# Patient Record
Sex: FEMALE | Race: WHITE
[De-identification: names, ages, dates, MRNs, and addresses within clinical notes are randomized per-mention and may not be internally consistent; named-entity substitution may affect disease eponyms.]

---
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
## 2017-02-06 NOTE — DS
DATE OF ADMISSION:  02/03/2017.

 

DATE OF DISCHARGE:  02/06/2017.

 

DISCHARGE DIAGNOSES:

 

AXIS I:  Adjustment disorder with depressed mood; bulimia nervosa by history.

 

AXIS II:  Deferred.

 

AXIS III:  None.

 

AXIS IV:  Moderate primary support stressors.

 

AXIS V:  At the time of admission was 45 and at the time of discharge is 60.

 

CONDITION AT THE TIME OF DISCHARGE:  Stable.  The patient is calm and cooperative. She is denying an
y suicidal or homicidal ideations.  She is future oriented, stating that she has a research project 
that she is going to be working on this week at school and she is agreeable with outpatient follow-u
p with her therapist in the community.  The acute stressor of this hospitalization was that she had 
treatment emergent suicidal ideations from an initiation of treatment with Escitalopram.  The Escita
lopram has since been discontinued and since its discontinuation, she has not had any further though
ts of self-harm.

 

MENTAL STATUS EXAM:  The patient is a young, white female with brown hair.  She is clean and well-gr
oomed with excellent eye contact and good posture.  There is no sign of movement abnormalities.  Spe
ech has a normal rate, tone and volume.  Her mood is euthymic with a full affect.  Thought process i
s linear and goal-directed. Thought content is significant for her desire to leave the hospital.  Sh
e is denying suicidal or homicidal ideations.  She denies auditory of visual hallucinations.  Insigh
t and judgment appears to be fair given her willingness to follow-up with outpatient treatment.  Cog
nitively, she is awake and alert with what appears to be an average intellect.

 

DISCHARGE INSTRUCTIONS TO THE PATIENT: A.  Medications:  She is not on any current medications.

 

B.  Diet:  Regular.

 

C.  Activities:  As tolerated.  The patient is a nonsmoker.

 

D.  Follow-up care:  The patient will be following up within the next three days with her outpatient
 therapist, whose name is Katie Fry.

 

HOSPITAL COURSE - PART A: Reason for admission:  The patient is a 21-year-old, single, white female 
with a history of depression and eating disorder problems who was brought to the emergency room by [facility] Police after endorsing suicidal ideations at the [facility].  Eloise
arently, the patient had recently gotten into treatment at the [facility] through [facility] and she actually started an antidepressant which was Escitalopram 5 mg p.o. daily, 
started one week prior to presentation.  She felt fine at first, but then started noticing that she 
was having profound visions of either hanging herself or cutting her wrists.  She was alarmed enough
 to call the [facility] where she got an emergency appointment.  There she continu
ed to endorse these thoughts, however with no intention.  They felt that it would be safest to send 
her to the hospital and she was upset about this given the fact that she was frisked by security and
 walked out by EMT's in front of several peers who were in the waiting area.  At our emergency room,
 she denied suicidal ideation, stating that she was quite certain that her suicidality was due to th
e medication and now that she has discontinued it, she feels better.  On the morning of this evaluat
ion, she continued to assert the same sentiment.  I did speak with her father, who had communicated 
with her via phone from his home in [place].  He indicated that the patient had broken up with her 
boyfriend of the past one-and-a-half years and that she did, in fact, have a history of suicidal tho
ughts, although no significant attempts in the past.  He neither thought that she would benefit for 
admission, nor did he state that we should discharge her.  Ultimately, the treatment team felt that 
she would be best suited by being admitting given the lack of availability of follow-up services ove
r the weekend at [facility] [place].  When I screened her at the time of admission for neurovegetative symptoms
 of depression, she mostly denied everything.  When I asked her about the recent break-up with her b
oyfriend, she stated that this was actually something that had lifted her mood.  Her boyfriend appar
ently lives in [place], [place] and the two of them have become estranged and the patient felt
 that the break-up was for the best.

 

HOSPITAL COURSE - PART B: Psychiatric treatment rendered: The patient was admitted to the Adult Beha
vioral Health Unit where she was placed on q.30 minute checks for her own safety.  We continued the 
washout of her Escitalopram, and throughout the weekend that she was under our care, she continued t
o steadfastly deny suicidal ideations.  Ultimately, she opted not to follow-up with [facility] and states 
that she is not interested in antidepressant therapy at this time.  She is wanting to return to [facility] with her past private psychotherapist in the community, a woman named Katie Fry on [facility].  This appointment was set by the social work staff and we feel that the patient is oli
dy for discharge.  Her father has been contacted again and is in agreement with the discharge plan.

 

 [id number]/[facility] #: [id number]

## 2019-09-16 ENCOUNTER — HOSPITAL ENCOUNTER (OUTPATIENT)
Dept: HOSPITAL 25 - OREAST | Age: 24
Discharge: HOME | End: 2019-09-16
Attending: ORTHOPAEDIC SURGERY
Payer: COMMERCIAL

## 2019-09-16 VITALS — DIASTOLIC BLOOD PRESSURE: 94 MMHG | SYSTOLIC BLOOD PRESSURE: 114 MMHG

## 2019-09-16 DIAGNOSIS — M23.8X2: ICD-10-CM

## 2019-09-16 DIAGNOSIS — T84.84XA: Primary | ICD-10-CM

## 2019-09-16 DIAGNOSIS — F32.9: ICD-10-CM
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
## 2019-09-16 PROCEDURE — 88300 SURGICAL PATH GROSS: CPT

## 2019-09-16 PROCEDURE — 76000 FLUOROSCOPY <1 HR PHYS/QHP: CPT

## 2019-09-16 PROCEDURE — 81025 URINE PREGNANCY TEST: CPT

## 2019-09-16 NOTE — OP
CC:  PCP, [facility] *

 

DATE OF OPERATION:  09/16/19 [facility]

 

DATE OF BIRTH:  [birth year]

 

SURGEON:  Zenobia Narvaez MD

 

ASSISTANT:  CASH Williamson.  An assistant was needed for the entirety of 
the case to help with positioning, retraction, and was utilized throughout all 
portions of the case.

 

ANESTHESIOLOGIST:  Dr. James.

 

ANESTHESIA:  General.

 

PRE-OP DIAGNOSIS:  Left knee painful retained hardware.

 

POST-OP DIAGNOSIS:  Left knee painful retained hardware.

 

OPERATIVE PROCEDURE:  Left knee open removal of hardware, 2 screws.

 

COMPLICATIONS:  None.

 

ESTIMATED BLOOD LOSS:  Minimal.

 

INDICATIONS:  Rimma Munroe is a 23-year-old female who has had persistent 
subluxation and dislocation of the knee cap, who underwent a patella 
realignment arthroscopy about a year ago.  She had completely healed her 
osteotomy and had some pain where the screw heads were.  We talked about 
removal of hardware.  She elected to proceed.  Risks and benefits were 
discussed in length included, but are not limited to bleeding; infection; 
damage to nerves, vessels, surrounding structures; wound nonhealing; persistent 
pain; need for further surgery; scarring; stiffness; incomplete relief of 
symptoms; risks of anesthesia.

 

DESCRIPTION OF PROCEDURE:  The patient was greeted in the preoperative area by 
the attending surgeon.  Correct extremity was marked and consent was confirmed.
  The was brought back to the operating suite where she was placed in supine 
position on the operating table, underwent general anesthesia and endotracheal 
intubation, after which she was appropriately positioned in the bed.  The left 
knee and leg were prepped and draped in usual sterile fashion beginning with 
chlorhexidine soap, scrub, and alcohol wipe, and a final prep with ChloraPrep.

 

After appropriate surgical pause indicating side, site, procedure, and 
administration of antibiotics, the previously made incision was incised with a 
15 blade.  Soft tissues were carefully dissected to expose the hardware.  Both 
screws were readily identified and had scar tissue over it.  These were then 
removed in their entirety, but no evidence of breaking the screw site.  Screw 
holes were then debrided back using a curette and then the wounds were 
copiously irrigated with sterile saline.  The incision was closed in layers 
with 3-0 Monocryl and 3-0 nylon. Sterile dressings were applied.  Knee was 
superficially injected with 0.2% ropivacaine for pain control.  Sterile 
dressings were applied.  She was awoken from anesthesia, transferred to the 
PACU in stable condition.

 

An x-ray was done after the dressing was put on to confirm that there was no 
retained hardware and no fracture.

 

POSTOPERATIVE PLAN:  She will be weightbearing as tolerated.  Range of motion 
as tolerated.  Discharged on pain medications and antibiotics.  DVT prophylaxis 
was considered, but deferred due to no previous personal or family history.  I 
will see the patient back in 10 to 14 days.

 

 [id number]/[facility] #: [id number]

JOCELIN

## 2021-10-27 NOTE — PN
MHU: Group Therapy Note





- Service Type


Service Type: 91329 Group Psychotherapy - Cognitive Behavioral Group Therapy (

CBT):Patient was attentive and participatory in CBT programming this morning, 

and remained in good behavioral control.  Patient expressed positive insights 

regarding relevant treatment interventions and goals. Suturegard Body: The suture ends were repeatedly re-tightened and re-clamped to achieve the desired tissue expansion.

## 2024-05-01 NOTE — ED
I, Slim Cain, scribed for José Miguel Guillen MD on 02/03/17 at 1814 .





Progress





- Progress Note


Progress Note: 


This is a sign out from Dr. Rivera who will be voluntarily admitted to psych. 

She consulted with mental health with no complications.





- Consult/PCP


Time Called: 14:48





Course/Dx





- Provider Notifications


Discussed Care Of Patient With: 14:48p Yiselchhaya Padron about the case.  I 

told her that she needs admission for psychiatric stabilization due to the 

vivid and unprovoked suicidal thoughts.  She agreed.  14:59p Discussed the case 

with Dr. Guillen about the patient needing admission for psychiatric 

stabilization due to new onset suicidal thoughts without inciting cause other 

than new medication.  She is medically cleared and unlikely to be an occult 

hyperthyroid cause.  Yisel Padron has already seen the patient and agrees 

with my plan.  If the psychiatrist feels patient can be discharged, she would 

need to have a safe environment and support system, as well as urgent 

psychiatry follow up.





- Diagnoses


Provider Diagnoses: 


 Suicidal ideations








The documentation as recorded by the Ant quinonez Aidan accurately reflects 

the service I personally performed and the decisions made by me, José Miguel Guillen MD.
Psychiatric Complaint





- HPI Summary


HPI Summary: 





Patient presents for evaluation of suicidal thoughts.  Patient has been slowly 

increasing her Escitalopram over the last 7 days, according to instructions 

from the [facility] provider.  However, in the last few days has 

had clear suicidal thoughts.  Started on Escitalopram last week due to 

depression, which is a recurrent issue; however, has never had thoughts or 

attempts at suicide.  Called her counselor and was directed to the ED.  Denies 

any coingestants, abd or chest pain, dyspnea, systemic symptoms.  No specific 

aggrav factors.





- History Of Current Complaint


Chief Complaint: EDMentalHealth


Time Seen by Provider: 02/03/17 13:04


Hx Obtained From: Patient


Onset/Duration: Gradual Onset, Lasting Days


Timing: Constant


Severity Initially: Moderate


Severity Currently: Moderate


Character: Depressed


Aggravating Factor(s): Nothing


Alleviating Factor(s): Medication


Associated Signs And Symptoms: Negative: Hostile, Confused, Hallucinating, 

Paranoid Behavior, Sleep Disturbance, Appetite Change, Social Withdrawal, 

Social Isolation


Related History: Positive For: Prior Psychiatric Issues


Has Suicidal: Reports: Thoughts.  Denies: Demonstrates Gesture, Has Prior 

Attempt(s)


Has Homicidal: Denies: Thoughts, With A Plan, Demonstrates Gesture, Has Prior 

Attempt(s)


Recent Stressor(s): None





- Risk Factor(s)


Completed Suicide Risk Factors: Negative





- Allergies/Home Medications


Home Medications: 


 Home Medications





Lexapro 5 mg PO DAILY 02/03/17 [History Confirmed 02/03/17]











PMH/Surg Hx/FS Hx/Imm Hx


Previously Healthy: Yes





Review of Systems


Constitutional: Negative


Negative: Fever, Chills


Eyes: Negative


ENT: Negative


Cardiovascular: Negative


Negative: Palpitations, Chest Pain


Respiratory: Negative


Negative: Shortness Of Breath, Cough


Gastrointestinal: Negative


Negative: Abdominal Pain, Vomiting


Genitourinary: Negative


Musculoskeletal: Negative


Neurological: Negative


Negative: Headache, Weakness, Paresthesia, Numbness, Syncope, Slurred Speech


Positive: Depressed


All Other Systems Reviewed And Are Negative: Yes





Physical Exam


Triage Information Reviewed: Yes


Vital Signs Reviewed: Yes


Appearance: Positive: Well-Appearing, No Pain Distress, Well-Nourished


Skin: Positive: Warm, Skin Color Reflects Adequate Perfusion, Dry


Head/Face: Positive: Normal Head/Face Inspection


Eyes: Positive: Normal, EOMI, VILLA


ENT: Positive: Normal ENT inspection


Neck: Positive: Supple, Nontender, No Lymphadenopathy, Other: - No thyromegaly.

  Negative: Enlarged Nodes @


Respiratory/Lung Sounds: Positive: Clear to Auscultation, Breath Sounds Present


Cardiovascular: Positive: Normal, RRR, Pulses are Symmetrical in both Upper and 

Lower Extremities


Abdomen Description: Positive: Nontender, No Organomegaly, Soft


Musculoskeletal: Positive: Normal, Strength/ROM Intact


Neurological: Positive: Normal, Sensory/Motor Intact, Alert, Oriented to Person 

Place, Time, CN Intact II-III.  Negative: Cerebellar Dysfunction, Slurred Speech

, Ataxic Gait


Psychiatric: Positive: Anxious, Depressed





Diagnostics





- Laboratory


Lab Statement: Any lab studies that have been ordered have been reviewed, and 

results considered in the medical decision making process.





- EKG


  ** No standard instances


EKG Rhythm: Sinus Rhythm


ST Segment: Normal


Ectopy: None





Course/Dx





- Differential Dx/Clinical Impression


Differential Diagnosis/HQI/PQRI: Positive: Anxiety, Depression, Suicidal 

Ideation, Other - Unclear if this is medication induced suicidal thoughts, but 

no previous attempts or plan.  Only during the new medication, has she had 

vivid thoughts of suicide without inciting factors.  No signs of previous 

physical attempts and she denies previous ingestions.  Has been previously in 

therapy during Undergraduate studies.


Provider Diagnosis: 


 Suicidal ideations








- Physician Notifications


Discussed Care Of Patient With: 14:48p Yisel Padron about the case.  I 

told her that she needs admission for psychiatric stabilization due to the 

vivid and unprovoked suicidal thoughts.  She agreed.  14:59p Discussed the case 

with Dr. Guillen about the patient needing admission for psychiatric 

stabilization due to new onset suicidal thoughts without inciting cause other 

than new medication.  She is medically cleared and unlikely to be an occult 

hyperthyroid cause.  Yisel Padron has already seen the patient and agrees 

with my plan.  If the psychiatrist feels patient can be discharged, she would 

need to have a safe environment and support system, as well as urgent 

psychiatry follow up.





Discharge





- Discharge Plan


Condition: Good


Disposition: [place]ATR[facility] [place]
150 feet